# Patient Record
Sex: MALE | Race: WHITE | NOT HISPANIC OR LATINO | Employment: OTHER | URBAN - METROPOLITAN AREA
[De-identification: names, ages, dates, MRNs, and addresses within clinical notes are randomized per-mention and may not be internally consistent; named-entity substitution may affect disease eponyms.]

---

## 2019-12-04 ENCOUNTER — TRANSCRIBE ORDERS (OUTPATIENT)
Dept: OTHER | Facility: OTHER | Age: 59
End: 2019-12-04

## 2019-12-04 DIAGNOSIS — R10.11 CHRONIC RIGHT UPPER QUADRANT PAIN: Primary | ICD-10-CM

## 2019-12-04 DIAGNOSIS — G89.29 CHRONIC RIGHT UPPER QUADRANT PAIN: Primary | ICD-10-CM

## 2019-12-04 DIAGNOSIS — R10.11 RUQ ABDOMINAL PAIN: Primary | ICD-10-CM

## 2019-12-05 ENCOUNTER — TRANSCRIBE ORDERS (OUTPATIENT)
Dept: ADMINISTRATIVE | Facility: HOSPITAL | Age: 59
End: 2019-12-05

## 2019-12-05 DIAGNOSIS — R10.11 ABDOMINAL PAIN, RIGHT UPPER QUADRANT: Primary | ICD-10-CM

## 2019-12-12 ENCOUNTER — HOSPITAL ENCOUNTER (OUTPATIENT)
Dept: NUCLEAR MEDICINE | Facility: HOSPITAL | Age: 59
Discharge: HOME | End: 2019-12-12

## 2019-12-12 ENCOUNTER — HOSPITAL ENCOUNTER (OUTPATIENT)
Dept: ULTRASOUND IMAGING | Facility: HOSPITAL | Age: 59
Discharge: HOME | End: 2019-12-12

## 2019-12-12 DIAGNOSIS — R10.11 RUQ ABDOMINAL PAIN: ICD-10-CM

## 2019-12-12 DIAGNOSIS — G89.29 CHRONIC RIGHT UPPER QUADRANT PAIN: ICD-10-CM

## 2019-12-12 DIAGNOSIS — R10.11 CHRONIC RIGHT UPPER QUADRANT PAIN: ICD-10-CM

## 2019-12-12 PROCEDURE — 76700 US EXAM ABDOM COMPLETE: CPT

## 2019-12-12 PROCEDURE — A9537 TC99M MEBROFENIN: HCPCS | Performed by: NURSE PRACTITIONER

## 2019-12-12 PROCEDURE — 76700 US EXAM ABDOM COMPLETE: CPT | Performed by: RADIOLOGY

## 2019-12-12 PROCEDURE — 78227 HEPATOBIL SYST IMAGE W/DRUG: CPT | Performed by: RADIOLOGY

## 2019-12-12 PROCEDURE — 0 TECHNETIUM TC 99M MEBROFENIN KIT: Performed by: NURSE PRACTITIONER

## 2019-12-12 PROCEDURE — 25010000002 SINCALIDE PER 5 MCG: Performed by: NURSE PRACTITIONER

## 2019-12-12 PROCEDURE — 78227 HEPATOBIL SYST IMAGE W/DRUG: CPT

## 2019-12-12 RX ORDER — KIT FOR THE PREPARATION OF TECHNETIUM TC 99M MEBROFENIN 45 MG/10ML
1 INJECTION, POWDER, LYOPHILIZED, FOR SOLUTION INTRAVENOUS
Status: COMPLETED | OUTPATIENT
Start: 2019-12-12 | End: 2019-12-12

## 2019-12-12 RX ADMIN — MEBROFENIN 1 DOSE: 45 INJECTION, POWDER, LYOPHILIZED, FOR SOLUTION INTRAVENOUS at 11:25

## 2019-12-12 RX ADMIN — SINCALIDE 3 MCG: 5 INJECTION, POWDER, LYOPHILIZED, FOR SOLUTION INTRAVENOUS at 12:29

## 2019-12-18 ENCOUNTER — OFFICE VISIT (OUTPATIENT)
Dept: SURGERY | Facility: CLINIC | Age: 59
End: 2019-12-18

## 2019-12-18 VITALS
HEART RATE: 100 BPM | BODY MASS INDEX: 22.96 KG/M2 | HEIGHT: 71 IN | WEIGHT: 164 LBS | DIASTOLIC BLOOD PRESSURE: 118 MMHG | SYSTOLIC BLOOD PRESSURE: 165 MMHG

## 2019-12-18 DIAGNOSIS — K82.4 GALLBLADDER POLYP: Primary | ICD-10-CM

## 2019-12-18 DIAGNOSIS — K82.8 BILIARY DYSKINESIA: ICD-10-CM

## 2019-12-18 DIAGNOSIS — K21.9 GASTROESOPHAGEAL REFLUX DISEASE WITHOUT ESOPHAGITIS: ICD-10-CM

## 2019-12-18 PROCEDURE — 99203 OFFICE O/P NEW LOW 30 MIN: CPT | Performed by: SURGERY

## 2019-12-18 RX ORDER — MELATONIN
1000 DAILY
COMMUNITY

## 2019-12-18 RX ORDER — CYCLOBENZAPRINE HCL 10 MG
TABLET ORAL
Refills: 4 | COMMUNITY
Start: 2019-11-20

## 2019-12-18 RX ORDER — CHLORAL HYDRATE 500 MG
CAPSULE ORAL
COMMUNITY

## 2019-12-18 RX ORDER — LUTEIN 6 MG
TABLET ORAL
COMMUNITY

## 2019-12-18 RX ORDER — AMITRIPTYLINE HYDROCHLORIDE 10 MG/1
TABLET, FILM COATED ORAL
Refills: 2 | COMMUNITY
Start: 2019-11-20

## 2019-12-18 RX ORDER — MULTIPLE VITAMINS W/ MINERALS TAB 9MG-400MCG
1 TAB ORAL DAILY
COMMUNITY

## 2019-12-18 RX ORDER — PANTOPRAZOLE SODIUM 40 MG/1
40 TABLET, DELAYED RELEASE ORAL DAILY
Qty: 30 TABLET | Refills: 1 | Status: SHIPPED | OUTPATIENT
Start: 2019-12-18 | End: 2020-12-17

## 2019-12-18 RX ORDER — MONTELUKAST SODIUM 10 MG/1
TABLET ORAL
Refills: 3 | COMMUNITY
Start: 2019-11-20

## 2019-12-18 RX ORDER — LISINOPRIL AND HYDROCHLOROTHIAZIDE 12.5; 1 MG/1; MG/1
TABLET ORAL
COMMUNITY
Start: 2019-12-15

## 2019-12-18 RX ORDER — AMLODIPINE BESYLATE 5 MG/1
TABLET ORAL
Refills: 3 | COMMUNITY
Start: 2019-11-22

## 2019-12-18 NOTE — PROGRESS NOTES
Subjective   Alfonso Brewer is a 59 y.o. male is being seen for consultation today at the request of Kimberlee Calle APRN    Alfonso Brewer is a 59 y.o. male With 1-2 episodes of right upper quadrant abdominal pain after fatty and greasy meals.  The patient underwent ultrasound showing a 7 mm gallbladder polyp versus stone.  No signs of cholecystitis.  No frequent attacks noted.  No previous abdominal surgery.  The patient smokes a pipe daily.  The patient has HIDA scan showing ejection fraction of 13%.      Past Medical History:   Diagnosis Date   • Hypertension        History reviewed. No pertinent family history.    Social History     Socioeconomic History   • Marital status:      Spouse name: Not on file   • Number of children: Not on file   • Years of education: Not on file   • Highest education level: Not on file   Tobacco Use   • Smoking status: Light Tobacco Smoker     Types: Pipe   • Smokeless tobacco: Never Used   Substance and Sexual Activity   • Alcohol use: Never     Frequency: Never   • Drug use: Never       History reviewed. No pertinent surgical history.    Review of Systems   Constitutional: Negative for activity change, appetite change, chills and fever.   HENT: Negative for sore throat and trouble swallowing.    Eyes: Negative for visual disturbance.   Respiratory: Negative for cough and shortness of breath.    Cardiovascular: Negative for chest pain and palpitations.   Gastrointestinal: Positive for abdominal pain. Negative for abdominal distention, blood in stool, constipation, diarrhea, nausea and vomiting.   Endocrine: Negative for cold intolerance and heat intolerance.   Genitourinary: Negative for dysuria.   Musculoskeletal: Negative for joint swelling.   Skin: Negative for color change, rash and wound.   Allergic/Immunologic: Negative for immunocompromised state.   Neurological: Negative for dizziness, seizures, weakness and headaches.   Hematological: Negative for adenopathy. Does not  "bruise/bleed easily.   Psychiatric/Behavioral: Negative for agitation and confusion.         BP (!) 165/118   Pulse 100   Ht 180.3 cm (71\")   Wt 74.4 kg (164 lb)   BMI 22.87 kg/m²   Objective   Physical Exam   Constitutional: He is oriented to person, place, and time. He appears well-developed.   HENT:   Head: Normocephalic and atraumatic.   Mouth/Throat: Mucous membranes are normal.   Eyes: Pupils are equal, round, and reactive to light. Conjunctivae are normal.   Neck: Neck supple. No JVD present. No tracheal deviation present. No thyromegaly present.   Cardiovascular: Normal rate and regular rhythm. Exam reveals no gallop and no friction rub.   No murmur heard.  Pulmonary/Chest: Effort normal and breath sounds normal.   Abdominal: Soft. He exhibits no distension. There is no splenomegaly or hepatomegaly. There is no tenderness. No hernia.   Musculoskeletal: Normal range of motion. He exhibits no deformity.   Neurological: He is alert and oriented to person, place, and time.   Skin: Skin is warm and dry.   Psychiatric: He has a normal mood and affect.             Assessment   Alfonso was seen today for cholelithiasis.    Diagnoses and all orders for this visit:    Gallbladder polyp  -     US Gallbladder; Future    Gastroesophageal reflux disease without esophagitis  -     US Gallbladder; Future    Biliary dyskinesia    Other orders  -     pantoprazole (PROTONIX) 40 MG EC tablet; Take 1 tablet by mouth Daily.      Alfonso Brewer is a 59 y.o. male with gallbladder polyp versus stone.  He also has biliary dyskinesia with an ejection fraction less than 35%.  The patient understands the risks and benefits of surgery and will defer surgery at this time and proceed with follow-up with imaging in 6 months to assess this polyp.  We will also initiate PPI therapy for reflux symptomatology.    Patient's Body mass index is 22.87 kg/m². BMI is within normal parameters. No follow-up required..             "

## 2020-03-02 RX ORDER — PANTOPRAZOLE SODIUM 40 MG/1
TABLET, DELAYED RELEASE ORAL
Qty: 30 TABLET | Refills: 1 | OUTPATIENT
Start: 2020-03-02

## 2020-05-05 ENCOUNTER — HOSPITAL ENCOUNTER (OUTPATIENT)
Dept: ULTRASOUND IMAGING | Facility: HOSPITAL | Age: 60
Discharge: HOME | End: 2020-05-05
Admitting: SURGERY

## 2020-05-05 DIAGNOSIS — K21.9 GASTROESOPHAGEAL REFLUX DISEASE WITHOUT ESOPHAGITIS: ICD-10-CM

## 2020-05-05 DIAGNOSIS — K82.4 GALLBLADDER POLYP: ICD-10-CM

## 2020-05-05 PROCEDURE — 76705 ECHO EXAM OF ABDOMEN: CPT

## 2020-05-05 PROCEDURE — 76705 ECHO EXAM OF ABDOMEN: CPT | Performed by: RADIOLOGY

## 2021-02-01 ENCOUNTER — TRANSCRIBE ORDERS (OUTPATIENT)
Dept: ADMINISTRATIVE | Facility: HOSPITAL | Age: 61
End: 2021-02-01

## 2021-02-01 DIAGNOSIS — I10 ESSENTIAL HYPERTENSION, MALIGNANT: Primary | ICD-10-CM

## 2021-02-01 DIAGNOSIS — R05.9 COUGH: ICD-10-CM

## 2021-02-01 DIAGNOSIS — R06.02 SHORTNESS OF BREATH: ICD-10-CM

## 2021-02-01 DIAGNOSIS — J06.9 ACUTE UPPER RESPIRATORY INFECTION: ICD-10-CM

## 2021-02-04 ENCOUNTER — HOSPITAL ENCOUNTER (OUTPATIENT)
Dept: CT IMAGING | Facility: HOSPITAL | Age: 61
Discharge: HOME | End: 2021-02-04
Admitting: NURSE PRACTITIONER

## 2021-02-04 DIAGNOSIS — R06.02 SHORTNESS OF BREATH: ICD-10-CM

## 2021-02-04 DIAGNOSIS — I10 ESSENTIAL HYPERTENSION, MALIGNANT: ICD-10-CM

## 2021-02-04 DIAGNOSIS — J06.9 ACUTE UPPER RESPIRATORY INFECTION: ICD-10-CM

## 2021-02-04 DIAGNOSIS — R05.9 COUGH: ICD-10-CM

## 2021-02-04 LAB — CREAT BLDA-MCNC: 1.1 MG/DL (ref 0.6–1.3)

## 2021-02-04 PROCEDURE — 71260 CT THORAX DX C+: CPT

## 2021-02-04 PROCEDURE — 71260 CT THORAX DX C+: CPT | Performed by: RADIOLOGY

## 2021-02-04 PROCEDURE — 25010000002 IOPAMIDOL 61 % SOLUTION: Performed by: NURSE PRACTITIONER

## 2021-02-04 PROCEDURE — 82565 ASSAY OF CREATININE: CPT

## 2021-02-04 RX ADMIN — IOPAMIDOL 100 ML: 612 INJECTION, SOLUTION INTRAVENOUS at 09:04

## 2021-03-25 DIAGNOSIS — K82.4 GALLBLADDER POLYP: Primary | ICD-10-CM

## 2021-04-06 ENCOUNTER — IMMUNIZATION (OUTPATIENT)
Dept: VACCINE CLINIC | Facility: HOSPITAL | Age: 61
End: 2021-04-06

## 2021-04-06 PROCEDURE — 91300 HC SARSCOV02 VAC 30MCG/0.3ML IM: CPT | Performed by: INTERNAL MEDICINE

## 2021-04-06 PROCEDURE — 0001A: CPT | Performed by: INTERNAL MEDICINE

## 2021-04-27 ENCOUNTER — IMMUNIZATION (OUTPATIENT)
Dept: VACCINE CLINIC | Facility: HOSPITAL | Age: 61
End: 2021-04-27

## 2021-04-27 PROCEDURE — 0002A: CPT | Performed by: INTERNAL MEDICINE

## 2021-04-27 PROCEDURE — 91300 HC SARSCOV02 VAC 30MCG/0.3ML IM: CPT | Performed by: INTERNAL MEDICINE

## 2022-11-22 DIAGNOSIS — M25.562 PAIN IN BOTH KNEES, UNSPECIFIED CHRONICITY: Primary | ICD-10-CM

## 2022-11-22 DIAGNOSIS — M25.561 PAIN IN BOTH KNEES, UNSPECIFIED CHRONICITY: Primary | ICD-10-CM

## 2022-11-28 ENCOUNTER — OFFICE VISIT (OUTPATIENT)
Dept: ORTHOPEDIC SURGERY | Facility: CLINIC | Age: 62
End: 2022-11-28

## 2022-11-28 ENCOUNTER — HOSPITAL ENCOUNTER (OUTPATIENT)
Dept: GENERAL RADIOLOGY | Facility: HOSPITAL | Age: 62
Discharge: HOME | End: 2022-11-28
Admitting: PHYSICIAN ASSISTANT

## 2022-11-28 VITALS
DIASTOLIC BLOOD PRESSURE: 92 MMHG | WEIGHT: 167 LBS | HEIGHT: 71 IN | BODY MASS INDEX: 23.38 KG/M2 | HEART RATE: 76 BPM | SYSTOLIC BLOOD PRESSURE: 137 MMHG | OXYGEN SATURATION: 100 %

## 2022-11-28 DIAGNOSIS — M25.561 PAIN IN BOTH KNEES, UNSPECIFIED CHRONICITY: ICD-10-CM

## 2022-11-28 DIAGNOSIS — M17.0 BILATERAL PRIMARY OSTEOARTHRITIS OF KNEE: Primary | ICD-10-CM

## 2022-11-28 DIAGNOSIS — M25.562 PAIN IN BOTH KNEES, UNSPECIFIED CHRONICITY: ICD-10-CM

## 2022-11-28 PROCEDURE — 20610 DRAIN/INJ JOINT/BURSA W/O US: CPT | Performed by: PHYSICIAN ASSISTANT

## 2022-11-28 PROCEDURE — 73562 X-RAY EXAM OF KNEE 3: CPT

## 2022-11-28 PROCEDURE — 73562 X-RAY EXAM OF KNEE 3: CPT | Performed by: RADIOLOGY

## 2022-11-28 PROCEDURE — 99203 OFFICE O/P NEW LOW 30 MIN: CPT | Performed by: PHYSICIAN ASSISTANT

## 2022-11-28 RX ORDER — LIDOCAINE HYDROCHLORIDE 10 MG/ML
5 INJECTION, SOLUTION EPIDURAL; INFILTRATION; INTRACAUDAL; PERINEURAL
Status: COMPLETED | OUTPATIENT
Start: 2022-11-28 | End: 2022-11-28

## 2022-11-28 RX ORDER — UBIDECARENONE 100 MG
100 CAPSULE ORAL DAILY
COMMUNITY

## 2022-11-28 RX ORDER — ROSUVASTATIN CALCIUM 10 MG/1
10 TABLET, COATED ORAL DAILY
COMMUNITY
Start: 2022-08-23

## 2022-11-28 RX ORDER — METHYLPREDNISOLONE ACETATE 80 MG/ML
80 INJECTION, SUSPENSION INTRA-ARTICULAR; INTRALESIONAL; INTRAMUSCULAR; SOFT TISSUE
Status: COMPLETED | OUTPATIENT
Start: 2022-11-28 | End: 2022-11-28

## 2022-11-28 RX ADMIN — LIDOCAINE HYDROCHLORIDE 5 ML: 10 INJECTION, SOLUTION EPIDURAL; INFILTRATION; INTRACAUDAL; PERINEURAL at 12:09

## 2022-11-28 RX ADMIN — METHYLPREDNISOLONE ACETATE 80 MG: 80 INJECTION, SUSPENSION INTRA-ARTICULAR; INTRALESIONAL; INTRAMUSCULAR; SOFT TISSUE at 12:09

## 2022-11-28 RX ADMIN — LIDOCAINE HYDROCHLORIDE 5 ML: 10 INJECTION, SOLUTION EPIDURAL; INFILTRATION; INTRACAUDAL; PERINEURAL at 12:10

## 2022-11-28 RX ADMIN — METHYLPREDNISOLONE ACETATE 80 MG: 80 INJECTION, SUSPENSION INTRA-ARTICULAR; INTRALESIONAL; INTRAMUSCULAR; SOFT TISSUE at 12:10

## 2022-11-28 NOTE — PROGRESS NOTES
[facility] [place] Orthopaedic Surgery New Patient Visit          Patient: Alfonso Brewer  YOB: 1960  Date of Encounter: 11/28/2022  PCP: Kimberlee Calle APRN      Subjective     Chief Complaint   Patient presents with   • Left Knee - Initial Evaluation, Pain   • Right Knee - Initial Evaluation, Pain           History of Present Illness:     Alfonso Brewer is a 62 y.o. male presents today as result of bilateral knee pain several years duration however this is worsened over the last 6 months. Patient states that he began to have pain throughout the middle of the day and this is typically worse towards the end of the day.  Patient reports occasional popping sensation to the right knee with difficulty upon getting up from a seated position when is happened.  Patient has undergone no conservative treatment options and denies any significant swelling.  He reports getting up from a seated position seems to be his largest exacerbating factor.  Patient declines any paresthesias.        Patient Active Problem List   Diagnosis   • Gastroesophageal reflux disease without esophagitis   • Gallbladder polyp   • Biliary dyskinesia     Past Medical History:   Diagnosis Date   • Hypertension      History reviewed. No pertinent surgical history.  Social History     Occupational History   • Not on file   Tobacco Use   • Smoking status: Light Smoker     Types: Pipe   • Smokeless tobacco: Never   Substance and Sexual Activity   • Alcohol use: Never   • Drug use: Never   • Sexual activity: Defer    Alfonso Brewer  reports that he has been smoking pipe. He has never used smokeless tobacco.. I have educated him on the risk of diseases from using tobacco products such as cancer, COPD and heart disease.     I advised him to quit and he is not willing to quit.    I spent 3  minutes counseling the patient.          Social History     Social History Narrative   • Not on file     History reviewed. No pertinent family history.  Current Outpatient  "Medications   Medication Sig Dispense Refill   • amitriptyline (ELAVIL) 10 MG tablet   2   • amLODIPine (NORVASC) 5 MG tablet   3   • Cetirizine HCl (ZYRTEC ALLERGY PO) Take  by mouth.     • cholecalciferol (VITAMIN D3) 25 MCG (1000 UT) tablet Take 1,000 Units by mouth Daily.     • coenzyme Q10 100 MG capsule Take 100 mg by mouth Daily.     • cyclobenzaprine (FLEXERIL) 10 MG tablet   4   • lisinopril-hydrochlorothiazide (PRINZIDE,ZESTORETIC) 10-12.5 MG per tablet      • montelukast (SINGULAIR) 10 MG tablet   3   • Multiple Vitamins-Minerals (MULTIVITAMIN WITH MINERALS) tablet tablet Take 1 tablet by mouth Daily.     • Omega-3 Fatty Acids (FISH OIL) 1000 MG capsule capsule Take  by mouth Daily With Breakfast.     • rosuvastatin (CRESTOR) 10 MG tablet Take 10 mg by mouth Daily. for cholesterol     • TURMERIC PO Take  by mouth.     • Lutein 20 MG tablet Take  by mouth.       No current facility-administered medications for this visit.     Allergies   Allergen Reactions   • Penicillins Diarrhea            Review of Systems   Constitutional: Negative.   HENT: Negative.    Eyes: Negative.    Cardiovascular: Negative.    Respiratory: Negative.    Endocrine: Negative.    Hematologic/Lymphatic: Negative.    Skin: Negative.    Musculoskeletal: Positive for joint pain.        Pertinent positives listed in HPI   Gastrointestinal: Negative.    Genitourinary: Negative.    Neurological: Negative.    Psychiatric/Behavioral: Negative.    Allergic/Immunologic: Negative.          Objective      Vitals:    11/28/22 1027   BP: 137/92   BP Location: Right arm   Patient Position: Sitting   Cuff Size: Adult   Pulse: 76   SpO2: 100%   Weight: 75.8 kg (167 lb)   Height: 180.3 cm (71\")      BMI is within normal parameters. No other follow-up for BMI required.      Physical Exam  Vitals and nursing note reviewed.   Constitutional:       General: He is not in acute distress.     Appearance: Normal appearance. He is not ill-appearing.   HENT:    "   Head: Normocephalic and atraumatic.      Right Ear: External ear normal.      Left Ear: External ear normal.      Nose: Nose normal.      Mouth/Throat:      Mouth: Mucous membranes are moist.      Pharynx: Oropharynx is clear.   Eyes:      Extraocular Movements: Extraocular movements intact.      Conjunctiva/sclera: Conjunctivae normal.      Pupils: Pupils are equal, round, and reactive to light.   Cardiovascular:      Rate and Rhythm: Normal rate.      Pulses: Normal pulses.   Pulmonary:      Effort: Pulmonary effort is normal.   Abdominal:      General: There is no distension.   Musculoskeletal:      Cervical back: Normal range of motion. No rigidity.      Right knee: Effusion, bony tenderness and crepitus present. No swelling, deformity, erythema or ecchymosis. Normal range of motion. Tenderness present over the medial joint line. No lateral joint line, MCL, LCL, ACL, PCL or patellar tendon tenderness. No LCL laxity, MCL laxity, ACL laxity or PCL laxity. Abnormal alignment (varus). Normal meniscus and normal patellar mobility. Normal pulse.      Instability Tests: Anterior drawer test negative. Posterior drawer test negative. Anterior Lachman test negative. Medial Wu test negative and lateral Wu test negative.      Left knee: Bony tenderness and crepitus present. No swelling, deformity, effusion, erythema, ecchymosis or lacerations. Decreased range of motion. Tenderness present over the medial joint line. No lateral joint line, MCL, LCL, ACL, PCL or patellar tendon tenderness. No LCL laxity, MCL laxity, ACL laxity or PCL laxity.Abnormal alignment (varus). Normal meniscus and normal patellar mobility. Normal pulse.      Instability Tests: Anterior drawer test negative. Posterior drawer test negative. Anterior Lachman test negative. Medial Wu test negative and lateral Wu test negative.   Skin:     General: Skin is warm and dry.      Capillary Refill: Capillary refill takes less than 2  seconds.   Neurological:      General: No focal deficit present.      Mental Status: He is alert and oriented to person, place, and time.      Cranial Nerves: Cranial nerves are intact.   Psychiatric:         Mood and Affect: Mood normal.         Behavior: Behavior normal.                 Radiology:      XR Knee 3 View Bilateral    Result Date: 11/28/2022  Osteoarthritis in both knees.  This report was finalized on 11/28/2022 10:48 AM by Dr. Roge Davey MD.              Assessment/Plan        ICD-10-CM ICD-9-CM   1. Bilateral primary osteoarthritis of knee  M17.0 715.16       62-year-old male with progressive bilateral knee osteoarthritis no specific injury.  The patient has had intermittent swelling and pain and difficulty upon repetitive normal daily activities.  As result of this and his radiographic findings the patient was provided today with intra-articular Depo-Medrol injection 80 mg with lidocaine block injected into the intra-articular space of the right knee followed by left knee respectively.  Patient was instructed to return back in 3 weeks for further discussion and encouraged to conservative treatment options to forego total knee arthroplasty.  Patient may be a good candidate for viscosupplementation depending on the efficacy of the injections.      Large Joint Arthrocentesis: R knee  Date/Time: 11/28/2022 12:09 PM  Consent given by: patient  Site marked: site marked  Supporting Documentation  Indications: pain and diagnostic evaluation   Procedure Details  Location: knee - R knee  Needle size: 25 G  Approach: anterolateral  Medications administered: 80 mg methylPREDNISolone acetate 80 MG/ML; 5 mL lidocaine PF 1% 1 %  Patient tolerance: patient tolerated the procedure well with no immediate complications    Large Joint Arthrocentesis: L knee  Date/Time: 11/28/2022 12:10 PM  Consent given by: patient  Site marked: site marked  Timeout: Immediately prior to procedure a time out was called to verify the  correct patient, procedure, equipment, support staff and site/side marked as required   Supporting Documentation  Indications: pain and diagnostic evaluation   Procedure Details  Location: knee - L knee  Needle size: 25 G  Approach: anterolateral  Medications administered: 80 mg methylPREDNISolone acetate 80 MG/ML; 5 mL lidocaine PF 1% 1 %  Patient tolerance: patient tolerated the procedure well with no immediate complications                      This document was signed by Carlos Rubalcava PA-C November 28, 2022     CC: Kimberlee Calle APRN

## 2022-12-20 ENCOUNTER — OFFICE VISIT (OUTPATIENT)
Dept: ORTHOPEDIC SURGERY | Facility: CLINIC | Age: 62
End: 2022-12-20
Payer: MEDICAID

## 2022-12-20 VITALS — BODY MASS INDEX: 23.38 KG/M2 | WEIGHT: 167 LBS | HEIGHT: 71 IN

## 2022-12-20 DIAGNOSIS — M17.0 BILATERAL PRIMARY OSTEOARTHRITIS OF KNEE: Primary | ICD-10-CM

## 2022-12-20 PROCEDURE — 1159F MED LIST DOCD IN RCRD: CPT | Performed by: PHYSICIAN ASSISTANT

## 2022-12-20 PROCEDURE — 1160F RVW MEDS BY RX/DR IN RCRD: CPT | Performed by: PHYSICIAN ASSISTANT

## 2022-12-20 PROCEDURE — 99213 OFFICE O/P EST LOW 20 MIN: CPT | Performed by: PHYSICIAN ASSISTANT

## 2022-12-20 RX ORDER — PANTOPRAZOLE SODIUM 40 MG/1
40 TABLET, DELAYED RELEASE ORAL DAILY
COMMUNITY
Start: 2022-11-28

## 2022-12-20 NOTE — PROGRESS NOTES
[facility] [place] Orthopaedic Surgery Established Patient Visit          Patient: Alfonso Brewer  YOB: 1960  Date of Encounter: 12/20/2022  PCP: Kimberlee Calle APRN      Subjective     Chief Complaint   Patient presents with   • Left Knee - Follow-up, Pain   • Right Knee - Follow-up, Pain           History of Present Illness:     Alfonso Brewer is a 62 y.o. male presents today as result of bilateral knee pain several years duration however this is worsened over the last 6 months. Patient states that he begin to have pain throughout the middle of the day and this is typically worse towards the end of the day.  Patient reports occasional popping sensation to the right knee with difficulty upon getting up from a seated position when is happened.  Patient has undergone recent conservative treatment with temporary alleviation of pain symptoms following intra-articular steroid injections.  Patient has done well with these and has questions in regards to further conservative treatment options to forego surgical intervention.        Patient Active Problem List   Diagnosis   • Gastroesophageal reflux disease without esophagitis   • Gallbladder polyp   • Biliary dyskinesia     Past Medical History:   Diagnosis Date   • Hypertension      History reviewed. No pertinent surgical history.  Social History     Occupational History   • Not on file   Tobacco Use   • Smoking status: Light Smoker     Types: Pipe   • Smokeless tobacco: Never   Vaping Use   • Vaping Use: Never used   Substance and Sexual Activity   • Alcohol use: Never   • Drug use: Never   • Sexual activity: Defer    Alfonso Brewer  reports that he has been smoking pipe. He has never used smokeless tobacco.. I have educated him on the risk of diseases from using tobacco products such as cancer, COPD and heart disease.     I advised him to quit and he is not willing to quit.              Social History     Social History Narrative   • Not on file     History reviewed. No  pertinent family history.  Current Outpatient Medications   Medication Sig Dispense Refill   • amitriptyline (ELAVIL) 10 MG tablet   2   • amLODIPine (NORVASC) 5 MG tablet   3   • Cetirizine HCl (ZYRTEC ALLERGY PO) Take  by mouth.     • cholecalciferol (VITAMIN D3) 25 MCG (1000 UT) tablet Take 1,000 Units by mouth Daily.     • coenzyme Q10 100 MG capsule Take 100 mg by mouth Daily.     • cyclobenzaprine (FLEXERIL) 10 MG tablet   4   • lisinopril-hydrochlorothiazide (PRINZIDE,ZESTORETIC) 10-12.5 MG per tablet      • Lutein 20 MG tablet Take  by mouth.     • montelukast (SINGULAIR) 10 MG tablet   3   • Multiple Vitamins-Minerals (MULTIVITAMIN WITH MINERALS) tablet tablet Take 1 tablet by mouth Daily.     • Omega-3 Fatty Acids (FISH OIL) 1000 MG capsule capsule Take  by mouth Daily With Breakfast.     • pantoprazole (PROTONIX) 40 MG EC tablet Take 40 mg by mouth Daily.     • rosuvastatin (CRESTOR) 10 MG tablet Take 10 mg by mouth Daily. for cholesterol     • TURMERIC PO Take  by mouth.       No current facility-administered medications for this visit.     Allergies   Allergen Reactions   • Penicillins Diarrhea            Review of Systems   Constitutional: Negative.   HENT: Negative.    Eyes: Negative.    Cardiovascular: Negative.    Respiratory: Negative.    Endocrine: Negative.    Hematologic/Lymphatic: Negative.    Skin: Negative.    Musculoskeletal: Positive for joint pain.        Pertinent positives listed in HPI   Gastrointestinal: Negative.    Genitourinary: Negative.    Neurological: Negative.    Psychiatric/Behavioral: Negative.    Allergic/Immunologic: Negative.          Objective      Vitals:    12/20/22 0922   Weight: 75.8 kg (167 lb)   Height: 180.3 cm (70.98\")      BMI is within normal parameters. No other follow-up for BMI required.      Physical Exam  Vitals and nursing note reviewed.   Constitutional:       General: He is not in acute distress.     Appearance: Normal appearance. He is not  ill-appearing.   HENT:      Head: Normocephalic and atraumatic.      Right Ear: External ear normal.      Left Ear: External ear normal.      Nose: Nose normal.      Mouth/Throat:      Mouth: Mucous membranes are moist.      Pharynx: Oropharynx is clear.   Eyes:      Extraocular Movements: Extraocular movements intact.      Conjunctiva/sclera: Conjunctivae normal.      Pupils: Pupils are equal, round, and reactive to light.   Cardiovascular:      Rate and Rhythm: Normal rate.      Pulses: Normal pulses.   Pulmonary:      Effort: Pulmonary effort is normal.   Abdominal:      General: There is no distension.   Musculoskeletal:      Cervical back: Normal range of motion. No rigidity.      Right knee: Bony tenderness and crepitus present. No swelling, deformity, effusion, erythema or ecchymosis. Normal range of motion. Tenderness present over the medial joint line. No lateral joint line, MCL, LCL, ACL, PCL or patellar tendon tenderness. No LCL laxity, MCL laxity, ACL laxity or PCL laxity. Abnormal alignment (varus). Normal meniscus and normal patellar mobility. Normal pulse.      Instability Tests: Anterior drawer test negative. Posterior drawer test negative. Anterior Lachman test negative. Medial Wu test negative and lateral Wu test negative.      Left knee: Bony tenderness and crepitus present. No swelling, deformity, effusion, erythema, ecchymosis or lacerations. Normal range of motion. Tenderness present over the medial joint line. No lateral joint line, MCL, LCL, ACL, PCL or patellar tendon tenderness. No LCL laxity, MCL laxity, ACL laxity or PCL laxity.Abnormal alignment (varus). Normal meniscus and normal patellar mobility. Normal pulse.      Instability Tests: Anterior drawer test negative. Posterior drawer test negative. Anterior Lachman test negative. Medial Wu test negative and lateral Wu test negative.   Skin:     General: Skin is warm and dry.      Capillary Refill: Capillary refill  takes less than 2 seconds.   Neurological:      General: No focal deficit present.      Mental Status: He is alert and oriented to person, place, and time.   Psychiatric:         Mood and Affect: Mood normal.         Behavior: Behavior normal.                 Radiology:      XR Knee 3 View Bilateral    Result Date: 11/28/2022  Osteoarthritis in both knees.  This report was finalized on 11/28/2022 10:48 AM by Dr. Roge Davey MD.                Assessment/Plan        ICD-10-CM ICD-9-CM   1. Bilateral primary osteoarthritis of knee  M17.0 715.16       62-year-old male with notable progressive bilateral knee osteoarthritis.  The patient has responded in the past temporarily with intra-articular Depo-Medrol injections in which she is felt to be a good candidate for viscosupplementation.  As result of this the patient will require preauthorization for viscosupplementation.  The patient will return back upon completion of this and discussion of the potential for implementing this conservative treatment modality.  Ultimately the goal is to exhaust all conservative treatment options before discussing total knee arthroplasty.  Patient will return back once authorized to proceed with viscosupplementation.                This document was signed by Carlos Rubalcava PA-C January 2, 2023     CC: Kimberlee Calle APRN      Dictated Utilizing Dragon Dictation:   Please note that portions of this note were completed with a voice recognition program.   Part of this note may be an electronic transcription/translation of spoken language to printed text using the Dragon Dictation System.

## 2023-01-16 ENCOUNTER — OFFICE VISIT (OUTPATIENT)
Dept: ORTHOPEDIC SURGERY | Facility: CLINIC | Age: 63
End: 2023-01-16
Payer: MEDICAID

## 2023-01-16 VITALS — BODY MASS INDEX: 23.38 KG/M2 | WEIGHT: 167 LBS | HEIGHT: 71 IN

## 2023-01-16 DIAGNOSIS — M17.0 BILATERAL PRIMARY OSTEOARTHRITIS OF KNEE: Primary | ICD-10-CM

## 2023-01-16 PROCEDURE — 20610 DRAIN/INJ JOINT/BURSA W/O US: CPT | Performed by: PHYSICIAN ASSISTANT

## 2023-01-16 RX ORDER — LIDOCAINE HYDROCHLORIDE 10 MG/ML
5 INJECTION, SOLUTION EPIDURAL; INFILTRATION; INTRACAUDAL; PERINEURAL
Status: COMPLETED | OUTPATIENT
Start: 2023-01-16 | End: 2023-01-16

## 2023-01-16 RX ADMIN — LIDOCAINE HYDROCHLORIDE 5 ML: 10 INJECTION, SOLUTION EPIDURAL; INFILTRATION; INTRACAUDAL; PERINEURAL at 11:39

## 2023-01-16 NOTE — PROGRESS NOTES
[facility] [place] [facility] Established Patient Visit          Patient: Alfonso Brewer  YOB: 1960  Date of Encounter: 1/16/2023  PCP: Kimberlee Calle APRN      Subjective     Chief Complaint   Patient presents with   • Right Knee - Follow-up, Pain   • Left Knee - Follow-up, Pain           History of Present Illness:     Alfonso Brewer is a 62 y.o. male presents today as result of bilateral knee pain several years duration.  Patient has responded in the past with temporary response bilateral steroidal knee injections.  Patient has been authorized to proceed with Durolane bilateral knees.  He presents today for infusion of this.  He reports dull throbbing aching sensation that seems to worsen throughout the middle of the day and is worse at the end of the day.  He reports occasional popping sensation.  Denies any other new complaints.  No paresthesias        Patient Active Problem List   Diagnosis   • Gastroesophageal reflux disease without esophagitis   • Gallbladder polyp   • Biliary dyskinesia     Past Medical History:   Diagnosis Date   • Hypertension      History reviewed. No pertinent surgical history.  Social History     Occupational History   • Not on file   Tobacco Use   • Smoking status: Light Smoker     Types: Pipe   • Smokeless tobacco: Never   Vaping Use   • Vaping Use: Never used   Substance and Sexual Activity   • Alcohol use: Never   • Drug use: Never   • Sexual activity: Defer    Alfonso Brewer  reports that he has been smoking pipe. He has never used smokeless tobacco.. I have educated him on the risk of diseases from using tobacco products such as cancer, COPD and heart disease.     I advised him to quit and he is not willing to quit.              Social History     Social History Narrative   • Not on file     History reviewed. No pertinent family history.  Current Outpatient Medications   Medication Sig Dispense Refill   • amitriptyline (ELAVIL) 10 MG tablet   2   • amLODIPine (NORVASC) 5  "MG tablet   3   • Cetirizine HCl (ZYRTEC ALLERGY PO) Take  by mouth.     • cholecalciferol (VITAMIN D3) 25 MCG (1000 UT) tablet Take 1,000 Units by mouth Daily.     • coenzyme Q10 100 MG capsule Take 100 mg by mouth Daily.     • cyclobenzaprine (FLEXERIL) 10 MG tablet   4   • lisinopril-hydrochlorothiazide (PRINZIDE,ZESTORETIC) 10-12.5 MG per tablet      • Lutein 20 MG tablet Take  by mouth.     • montelukast (SINGULAIR) 10 MG tablet   3   • Multiple Vitamins-Minerals (MULTIVITAMIN WITH MINERALS) tablet tablet Take 1 tablet by mouth Daily.     • Omega-3 Fatty Acids (FISH OIL) 1000 MG capsule capsule Take  by mouth Daily With Breakfast.     • pantoprazole (PROTONIX) 40 MG EC tablet Take 40 mg by mouth Daily.     • rosuvastatin (CRESTOR) 10 MG tablet Take 10 mg by mouth Daily. for cholesterol     • TURMERIC PO Take  by mouth.       No current facility-administered medications for this visit.     Allergies   Allergen Reactions   • Penicillins Diarrhea            Review of Systems   Constitutional: Negative.   HENT: Negative.    Eyes: Negative.    Cardiovascular: Negative.    Respiratory: Negative.    Endocrine: Negative.    Hematologic/Lymphatic: Negative.    Skin: Negative.    Musculoskeletal: Positive for joint pain.        Pertinent positives listed in HPI   Gastrointestinal: Negative.    Genitourinary: Negative.    Neurological: Negative.    Psychiatric/Behavioral: Negative.    Allergic/Immunologic: Negative.          Objective      Vitals:    01/16/23 1103   Weight: 75.8 kg (167 lb)   Height: 180.3 cm (71\")      BMI is within normal parameters. No other follow-up for BMI required.      Physical Exam  Vitals and nursing note reviewed.   Constitutional:       General: He is not in acute distress.     Appearance: Normal appearance. He is not ill-appearing.   HENT:      Head: Normocephalic and atraumatic.      Right Ear: External ear normal.      Left Ear: External ear normal.      Nose: Nose normal.      " Mouth/Throat:      Mouth: Mucous membranes are moist.      Pharynx: Oropharynx is clear.   Eyes:      Extraocular Movements: Extraocular movements intact.      Conjunctiva/sclera: Conjunctivae normal.      Pupils: Pupils are equal, round, and reactive to light.   Cardiovascular:      Rate and Rhythm: Normal rate.      Pulses: Normal pulses.   Pulmonary:      Effort: Pulmonary effort is normal.   Abdominal:      General: There is no distension.   Musculoskeletal:      Cervical back: Normal range of motion. No rigidity.      Right knee: Bony tenderness and crepitus present. No swelling, deformity, effusion, erythema or ecchymosis. Normal range of motion. Tenderness present over the medial joint line. No lateral joint line, MCL, LCL, ACL, PCL or patellar tendon tenderness. No LCL laxity, MCL laxity, ACL laxity or PCL laxity. Abnormal alignment (varus). Normal meniscus and normal patellar mobility. Normal pulse.      Instability Tests: Anterior drawer test negative. Posterior drawer test negative. Anterior Lachman test negative. Medial Wu test negative and lateral Wu test negative.      Left knee: Bony tenderness and crepitus present. No swelling, deformity, effusion, erythema, ecchymosis or lacerations. Normal range of motion. Tenderness present over the medial joint line. No lateral joint line, MCL, LCL, ACL, PCL or patellar tendon tenderness. No LCL laxity, MCL laxity, ACL laxity or PCL laxity.Abnormal alignment (varus). Normal meniscus and normal patellar mobility. Normal pulse.      Instability Tests: Anterior drawer test negative. Posterior drawer test negative. Anterior Lachman test negative. Medial Wu test negative and lateral Wu test negative.   Skin:     General: Skin is warm and dry.      Capillary Refill: Capillary refill takes less than 2 seconds.   Neurological:      General: No focal deficit present.      Mental Status: He is alert and oriented to person, place, and time.    Psychiatric:         Mood and Affect: Mood normal.         Behavior: Behavior normal.                 Radiology:      XR Knee 3 View Bilateral    Result Date: 11/28/2022  Osteoarthritis in both knees.  This report was finalized on 11/28/2022 10:48 AM by Dr. Roge Davey MD.                Assessment/Plan        ICD-10-CM ICD-9-CM   1. Bilateral primary osteoarthritis of knee  M17.0 715.16       62-year-old male with noted progressive bilateral knee osteoarthritis.  Patient has responded in the past temporarily with intra-articular Depo-Medrol injections and the patient has been deemed a good candidate for viscosupplementation.  The patient has received authorization for Durolane bilateral knees 60 mg and this was completed with intra-articular injection with Durolane 3 mL with lidocaine block into the intra-articular space of the left knee followed by the right knee respectively.  Patient tolerated the procedures well.  He was instructed to return back in 4 weeks for further evaluation of the efficacy of the conservative treatment.      Large Joint Arthrocentesis: L knee  Date/Time: 1/16/2023 11:39 AM  Consent given by: patient  Site marked: site marked  Timeout: Immediately prior to procedure a time out was called to verify the correct patient, procedure, equipment, support staff and site/side marked as required   Supporting Documentation  Indications: pain and diagnostic evaluation   Procedure Details  Location: knee - L knee  Needle size: 20 G  Approach: anterolateral  Medications administered: 5 mL lidocaine PF 1% 1 %; 60 mg Sodium Hyaluronate 60 MG/3ML  Patient tolerance: patient tolerated the procedure well with no immediate complications    Large Joint Arthrocentesis: R knee  Date/Time: 1/16/2023 11:39 AM  Consent given by: patient  Site marked: site marked  Timeout: Immediately prior to procedure a time out was called to verify the correct patient, procedure, equipment, support staff and site/side marked  as required   Supporting Documentation  Indications: pain and diagnostic evaluation   Procedure Details  Location: knee - R knee  Needle size: 20 G  Approach: lateral  Medications administered: 5 mL lidocaine PF 1% 1 %; 60 mg Sodium Hyaluronate 60 MG/3ML  Patient tolerance: patient tolerated the procedure well with no immediate complications                      This document was signed by Carlos Rubalcava PA-C January 16, 2023     CC: Kimberlee Calle APRN      Dictated Utilizing Dragon Dictation:   Please note that portions of this note were completed with a voice recognition program.   Part of this note may be an electronic transcription/translation of spoken language to printed text using the Dragon Dictation System.

## 2023-02-13 ENCOUNTER — OFFICE VISIT (OUTPATIENT)
Dept: ORTHOPEDIC SURGERY | Facility: CLINIC | Age: 63
End: 2023-02-13
Payer: MEDICAID

## 2023-02-13 VITALS — WEIGHT: 167 LBS | HEIGHT: 71 IN | BODY MASS INDEX: 23.38 KG/M2

## 2023-02-13 DIAGNOSIS — M17.0 BILATERAL PRIMARY OSTEOARTHRITIS OF KNEE: Primary | ICD-10-CM

## 2023-02-13 PROCEDURE — 99213 OFFICE O/P EST LOW 20 MIN: CPT | Performed by: PHYSICIAN ASSISTANT

## 2023-02-13 NOTE — PROGRESS NOTES
[facility] [place] [facility] [place] Patient Visit          Patient: Alfonso Brewer  YOB: 1960  Date of Encounter: 2/13/2023  PCP: Kimberlee Calle APRN      Subjective     Chief Complaint   Patient presents with   • Left Knee - Follow-up   • Right Knee - Follow-up           History of Present Illness:     Alfonso Brewer is a 62 y.o. male presents today as result of bilateral knee pain several years duration.  Patient has responded in the past with temporary response bilateral steroidal knee injections and as of last office visit received Durolane bilateral knees with near complete resolution of pain and symptoms.  The patient reports no pain or instability or difficulty upon ambulation today.  Patient reports increased overall activity with no difficulty.  No other new complaints.  Denies any paresthesias.        Patient Active Problem List   Diagnosis   • Gastroesophageal reflux disease without esophagitis   • Gallbladder polyp   • Biliary dyskinesia     Past Medical History:   Diagnosis Date   • Hypertension      History reviewed. No pertinent surgical history.  Social History     Occupational History   • Not on file   Tobacco Use   • Smoking status: Light Smoker     Types: Pipe   • Smokeless tobacco: Never   Vaping Use   • Vaping Use: Never used   Substance and Sexual Activity   • Alcohol use: Never   • Drug use: Never   • Sexual activity: Defer    Alfonso Brewer  reports that he has been smoking pipe. He has never used smokeless tobacco.. I have educated him on the risk of diseases from using tobacco products such as cancer, COPD and heart disease.     I advised him to quit and he is not willing to quit.              Social History     Social History Narrative   • Not on file     History reviewed. No pertinent family history.  Current Outpatient Medications   Medication Sig Dispense Refill   • amitriptyline (ELAVIL) 10 MG tablet   2   • amLODIPine (NORVASC) 5 MG tablet   3   • Cetirizine HCl (ZYRTEC  "ALLERGY PO) Take  by mouth.     • cholecalciferol (VITAMIN D3) 25 MCG (1000 UT) tablet Take 1,000 Units by mouth Daily.     • coenzyme Q10 100 MG capsule Take 100 mg by mouth Daily.     • cyclobenzaprine (FLEXERIL) 10 MG tablet   4   • lisinopril-hydrochlorothiazide (PRINZIDE,ZESTORETIC) 10-12.5 MG per tablet      • Lutein 20 MG tablet Take  by mouth.     • montelukast (SINGULAIR) 10 MG tablet   3   • Multiple Vitamins-Minerals (MULTIVITAMIN WITH MINERALS) tablet tablet Take 1 tablet by mouth Daily.     • Omega-3 Fatty Acids (FISH OIL) 1000 MG capsule capsule Take  by mouth Daily With Breakfast.     • pantoprazole (PROTONIX) 40 MG EC tablet Take 40 mg by mouth Daily.     • rosuvastatin (CRESTOR) 10 MG tablet Take 10 mg by mouth Daily. for cholesterol     • TURMERIC PO Take  by mouth.       No current facility-administered medications for this visit.     Allergies   Allergen Reactions   • Penicillins Diarrhea            Review of Systems   Constitutional: Negative.   HENT: Negative.    Eyes: Negative.    Cardiovascular: Negative.    Respiratory: Negative.    Endocrine: Negative.    Hematologic/Lymphatic: Negative.    Skin: Negative.    Musculoskeletal: Positive for joint pain.        Pertinent positives listed in HPI   Gastrointestinal: Negative.    Genitourinary: Negative.    Neurological: Negative.    Psychiatric/Behavioral: Negative.    Allergic/Immunologic: Negative.          Objective      Vitals:    02/13/23 0934   Weight: 75.8 kg (167 lb)   Height: 180.3 cm (70.98\")      BMI is within normal parameters. No other follow-up for BMI required.      Physical Exam  Vitals and nursing note reviewed.   Constitutional:       General: He is not in acute distress.     Appearance: Normal appearance. He is not ill-appearing.   HENT:      Head: Normocephalic and atraumatic.      Right Ear: External ear normal.      Left Ear: External ear normal.      Nose: Nose normal.      Mouth/Throat:      Mouth: Mucous membranes are " moist.      Pharynx: Oropharynx is clear.   Eyes:      Extraocular Movements: Extraocular movements intact.      Conjunctiva/sclera: Conjunctivae normal.      Pupils: Pupils are equal, round, and reactive to light.   Cardiovascular:      Rate and Rhythm: Normal rate.      Pulses: Normal pulses.   Pulmonary:      Effort: Pulmonary effort is normal.   Abdominal:      General: There is no distension.   Musculoskeletal:      Cervical back: Normal range of motion. No rigidity.      Right knee: No swelling, deformity, effusion, erythema, ecchymosis, bony tenderness or crepitus. Normal range of motion. Tenderness present. No medial joint line, lateral joint line, MCL, LCL, ACL, PCL or patellar tendon tenderness. No LCL laxity, MCL laxity, ACL laxity or PCL laxity. Abnormal alignment (varus). Normal meniscus and normal patellar mobility. Normal pulse.      Instability Tests: Anterior drawer test negative. Posterior drawer test negative. Anterior Lachman test negative. Medial Wu test negative and lateral Wu test negative.      Left knee: No swelling, deformity, effusion, erythema, ecchymosis, lacerations, bony tenderness or crepitus. Normal range of motion. Tenderness present. No medial joint line, lateral joint line, MCL, LCL, ACL, PCL or patellar tendon tenderness. No LCL laxity, MCL laxity, ACL laxity or PCL laxity.Abnormal alignment (varus). Normal meniscus and normal patellar mobility. Normal pulse.      Instability Tests: Anterior drawer test negative. Posterior drawer test negative. Anterior Lachman test negative. Medial Wu test negative and lateral Wu test negative.   Skin:     General: Skin is warm and dry.      Capillary Refill: Capillary refill takes less than 2 seconds.   Neurological:      General: No focal deficit present.      Mental Status: He is alert and oriented to person, place, and time.   Psychiatric:         Mood and Affect: Mood normal.         Behavior: Behavior normal.                  Radiology:      XR Knee 3 View Bilateral    Result Date: 11/28/2022  Osteoarthritis in both knees.  This report was finalized on 11/28/2022 10:48 AM by Dr. Roge Davey MD.                Assessment/Plan      No diagnosis found.    62-year-old male with notable bilateral knee osteoarthritis.  The patient has responded in the past temporarily with intra-articular Depo-Medrol injections and last office visit he received infusion viscosupplementation Durolane bilateral knees.  The patient reports near complete resolution of pain and symptoms with decrease in pain with ambulation and normal daily activity.  Today as a result the patient will continue with normal daily activity and progressive activity and will return back in 5 months for discussion of repeat infusion.  The patient will require preauthorization to proceed forward with repeat Durolane infusion.                  This document was signed by Carlos Rubalcava PA-C February 13, 2023     CC: Kimberlee Calle APRN      Dictated Utilizing Dragon Dictation:   Please note that portions of this note were completed with a voice recognition program.   Part of this note may be an electronic transcription/translation of spoken language to printed text using the Dragon Dictation System.

## 2023-07-19 ENCOUNTER — OFFICE VISIT (OUTPATIENT)
Dept: ORTHOPEDIC SURGERY | Facility: CLINIC | Age: 63
End: 2023-07-19
Payer: MEDICAID

## 2023-07-19 VITALS — WEIGHT: 167 LBS | BODY MASS INDEX: 23.38 KG/M2 | HEIGHT: 71 IN

## 2023-07-19 DIAGNOSIS — M17.0 BILATERAL PRIMARY OSTEOARTHRITIS OF KNEE: Primary | ICD-10-CM

## 2023-07-19 RX ADMIN — LIDOCAINE HYDROCHLORIDE 5 ML: 10 INJECTION, SOLUTION EPIDURAL; INFILTRATION; INTRACAUDAL; PERINEURAL at 13:33

## 2023-07-19 RX ADMIN — LIDOCAINE HYDROCHLORIDE 5 ML: 10 INJECTION, SOLUTION EPIDURAL; INFILTRATION; INTRACAUDAL; PERINEURAL at 13:34

## 2023-08-02 RX ORDER — LIDOCAINE HYDROCHLORIDE 10 MG/ML
5 INJECTION, SOLUTION EPIDURAL; INFILTRATION; INTRACAUDAL; PERINEURAL
Status: COMPLETED | OUTPATIENT
Start: 2023-07-19 | End: 2023-07-19

## 2023-11-27 ENCOUNTER — TRANSCRIBE ORDERS (OUTPATIENT)
Dept: ADMINISTRATIVE | Facility: HOSPITAL | Age: 63
End: 2023-11-27
Payer: MEDICAID

## 2023-11-27 DIAGNOSIS — R07.9 CHEST PAIN, UNSPECIFIED TYPE: Primary | ICD-10-CM

## 2024-01-22 ENCOUNTER — OFFICE VISIT (OUTPATIENT)
Dept: ORTHOPEDIC SURGERY | Facility: CLINIC | Age: 64
End: 2024-01-22
Payer: COMMERCIAL

## 2024-01-22 VITALS — WEIGHT: 166 LBS | HEIGHT: 71 IN | BODY MASS INDEX: 23.24 KG/M2

## 2024-01-22 DIAGNOSIS — M17.0 BILATERAL PRIMARY OSTEOARTHRITIS OF KNEE: Primary | ICD-10-CM

## 2024-01-22 PROCEDURE — 20610 DRAIN/INJ JOINT/BURSA W/O US: CPT | Performed by: PHYSICIAN ASSISTANT

## 2024-01-22 RX ORDER — LIDOCAINE HYDROCHLORIDE 10 MG/ML
5 INJECTION, SOLUTION EPIDURAL; INFILTRATION; INTRACAUDAL; PERINEURAL
Status: COMPLETED | OUTPATIENT
Start: 2024-01-22 | End: 2024-01-22

## 2024-01-22 RX ADMIN — LIDOCAINE HYDROCHLORIDE 5 ML: 10 INJECTION, SOLUTION EPIDURAL; INFILTRATION; INTRACAUDAL; PERINEURAL at 09:14

## 2024-01-22 NOTE — PROGRESS NOTES
[facility] [place] Patient Visit          Patient: Alfonso Brewer  YOB: 1960  Date of Encounter: 1/22/2024  PCP: Kimberlee Calle APRN      Subjective     Chief Complaint   Patient presents with    Left Knee - Follow-up, Pain    Right Knee - Follow-up, Pain           History of Present Illness:     Alfonso Brewer is a 63 y.o. male presents today as result of bilateral knee pain several years duration.  Patient has responded in the past with temporary response bilateral steroidal knee injections and several month alleviation with Durolane bilateral knees with near complete resolution of pain and symptoms months.  Patient has had return of pain and symptoms into bilateral knees over the last several weeks.  He has began to have return of the medial joint line tenderness and painful ambulation.     Patient Active Problem List   Diagnosis    Gastroesophageal reflux disease without esophagitis    Gallbladder polyp    Biliary dyskinesia     Past Medical History:   Diagnosis Date    Hypertension      No past surgical history on file.  Social History     Occupational History    Not on file   Tobacco Use    Smoking status: Light Smoker     Types: Pipe    Smokeless tobacco: Never   Vaping Use    Vaping Use: Never used   Substance and Sexual Activity    Alcohol use: Never    Drug use: Never    Sexual activity: Not Currently     Partners: Female     Birth control/protection: Abstinence    Alfonso Brewer  reports that he has been smoking pipe. He has never used smokeless tobacco.. I have educated him on the risk of diseases from using tobacco products such as cancer, COPD and heart disease.     I advised him to quit and he is not willing to quit.              Social History     Social History Narrative    Not on file     No family history on file.  Current Outpatient Medications   Medication Sig Dispense Refill    amitriptyline (ELAVIL) 10 MG tablet   2    amLODIPine (NORVASC) 5 MG tablet   3     "Cetirizine HCl (ZYRTEC ALLERGY PO) Take  by mouth.      cholecalciferol (VITAMIN D3) 25 MCG (1000 UT) tablet Take 1 tablet by mouth Daily.      coenzyme Q10 100 MG capsule Take 1 capsule by mouth Daily.      cyclobenzaprine (FLEXERIL) 10 MG tablet   4    lisinopril-hydrochlorothiazide (PRINZIDE,ZESTORETIC) 10-12.5 MG per tablet       Lutein 20 MG tablet Take  by mouth.      montelukast (SINGULAIR) 10 MG tablet   3    Multiple Vitamins-Minerals (MULTIVITAMIN WITH MINERALS) tablet tablet Take 1 tablet by mouth Daily.      Omega-3 Fatty Acids (FISH OIL) 1000 MG capsule capsule Take  by mouth Daily With Breakfast.      pantoprazole (PROTONIX) 40 MG EC tablet Take 1 tablet by mouth Daily.      rosuvastatin (CRESTOR) 10 MG tablet Take 1 tablet by mouth Daily. for cholesterol      TURMERIC PO Take  by mouth.       No current facility-administered medications for this visit.     Allergies   Allergen Reactions    Penicillins Diarrhea            Review of Systems   Constitutional: Negative.   HENT: Negative.     Eyes: Negative.    Cardiovascular: Negative.    Respiratory: Negative.     Endocrine: Negative.    Hematologic/Lymphatic: Negative.    Skin: Negative.    Musculoskeletal:  Positive for joint pain.        Pertinent positives listed in HPI   Gastrointestinal: Negative.    Genitourinary: Negative.    Neurological: Negative.    Psychiatric/Behavioral: Negative.     Allergic/Immunologic: Negative.          Objective      Vitals:    01/22/24 0857   Weight: 75.3 kg (166 lb)   Height: 180.3 cm (71\")   PainSc:   8   PainLoc: Knee      BMI is within normal parameters. No other follow-up for BMI required.      Physical Exam  Vitals and nursing note reviewed.   Constitutional:       General: He is not in acute distress.     Appearance: Normal appearance. He is not ill-appearing.   HENT:      Head: Normocephalic and atraumatic.      Right Ear: External ear normal.      Left Ear: External ear normal.      Nose: Nose normal.      " Mouth/Throat:      Mouth: Mucous membranes are moist.      Pharynx: Oropharynx is clear.   Eyes:      Extraocular Movements: Extraocular movements intact.      Conjunctiva/sclera: Conjunctivae normal.      Pupils: Pupils are equal, round, and reactive to light.   Cardiovascular:      Rate and Rhythm: Normal rate.      Pulses: Normal pulses.   Pulmonary:      Effort: Pulmonary effort is normal.   Abdominal:      General: There is no distension.   Musculoskeletal:      Cervical back: Normal range of motion. No rigidity.      Right knee: Crepitus present. No swelling, deformity, effusion, erythema, ecchymosis or bony tenderness. Normal range of motion. Tenderness present over the medial joint line. No lateral joint line, MCL, LCL, ACL, PCL or patellar tendon tenderness. No LCL laxity, MCL laxity, ACL laxity or PCL laxity. Abnormal alignment (varus). Normal meniscus and normal patellar mobility. Normal pulse.      Instability Tests: Anterior drawer test negative. Posterior drawer test negative. Anterior Lachman test negative. Medial Wu test negative and lateral Wu test negative.      Left knee: Crepitus present. No swelling, deformity, effusion, erythema, ecchymosis, lacerations or bony tenderness. Normal range of motion. Tenderness present over the medial joint line. No lateral joint line, MCL, LCL, ACL, PCL or patellar tendon tenderness. No LCL laxity, MCL laxity, ACL laxity or PCL laxity.Abnormal alignment (varus). Normal meniscus and normal patellar mobility. Normal pulse.      Instability Tests: Anterior drawer test negative. Posterior drawer test negative. Anterior Lachman test negative. Medial Wu test negative and lateral Wu test negative.   Skin:     General: Skin is warm and dry.      Capillary Refill: Capillary refill takes less than 2 seconds.   Neurological:      General: No focal deficit present.      Mental Status: He is alert and oriented to person, place, and time.   Psychiatric:          Mood and Affect: Mood normal.         Behavior: Behavior normal.                 Radiology:      No radiology results for the last 90 days.              Assessment/Plan        ICD-10-CM ICD-9-CM   1. Bilateral primary osteoarthritis of knee  M17.0 715.16         62-year-old male with notable bilateral knee osteoarthritis.  The patient has responded in the past temporarily with intra-articular steroidal medication as well as most recently viscosupplementation Durolane bilateral knees.  As result of the noticeable several month history of alleviation of pain symptoms following the viscosupplementation the patient was provided today with intra-articular infusion right knee Durolane with lidocaine block followed by left knee respectively.  Patient was instructed to return back in 4 weeks.  Patient will return back in 4 weeks for further evaluation or any complication or worsening of pain/symptoms or upon absence of alleviation with the recent injections.      Large Joint Arthrocentesis: R knee  Date/Time: 1/22/2024 9:14 AM  Consent given by: patient  Site marked: site marked  Timeout: Immediately prior to procedure a time out was called to verify the correct patient, procedure, equipment, support staff and site/side marked as required   Supporting Documentation  Indications: pain and diagnostic evaluation   Procedure Details  Location: knee - R knee  Needle size: 20 G  Approach: lateral  Medications administered: 5 mL lidocaine PF 1% 1 %; 60 mg Sodium Hyaluronate 60 MG/3ML  Patient tolerance: patient tolerated the procedure well with no immediate complications      Large Joint Arthrocentesis: L knee  Date/Time: 1/22/2024 9:14 AM  Consent given by: patient  Site marked: site marked  Timeout: Immediately prior to procedure a time out was called to verify the correct patient, procedure, equipment, support staff and site/side marked as required   Supporting Documentation  Indications: pain and diagnostic evaluation    Procedure Details  Location: knee - L knee  Needle size: 20 G  Approach: anterolateral  Medications administered: 5 mL lidocaine PF 1% 1 %; 60 mg Sodium Hyaluronate 60 MG/3ML  Patient tolerance: patient tolerated the procedure well with no immediate complications                          This document was signed by Carlos Rubalcava PA-C January 22, 2024    CC: Kimberlee Calle APRN      Dictated Utilizing Dragon Dictation:   Please note that portions of this note were completed with a voice recognition program.   Part of this note may be an electronic transcription/translation of spoken language to printed text using the Dragon Dictation System.

## 2024-11-01 ENCOUNTER — TRANSCRIBE ORDERS (OUTPATIENT)
Dept: ADMINISTRATIVE | Facility: HOSPITAL | Age: 64
End: 2024-11-01
Payer: COMMERCIAL

## 2024-11-01 DIAGNOSIS — R31.9 HEMATURIA, UNSPECIFIED TYPE: Primary | ICD-10-CM
